# Patient Record
Sex: FEMALE | Race: BLACK OR AFRICAN AMERICAN
[De-identification: names, ages, dates, MRNs, and addresses within clinical notes are randomized per-mention and may not be internally consistent; named-entity substitution may affect disease eponyms.]

---

## 2017-03-16 ENCOUNTER — HOSPITAL ENCOUNTER (OUTPATIENT)
Dept: HOSPITAL 61 - MAMMO | Age: 47
Discharge: HOME | End: 2017-03-16
Attending: FAMILY MEDICINE
Payer: COMMERCIAL

## 2017-03-16 DIAGNOSIS — Z12.31: Primary | ICD-10-CM

## 2017-03-16 NOTE — RAD
DATE: 3/16/2017



EXAM: DIGITAL SCREEN BILAT W/CAD



HISTORY: Routine screening



COMPARISON: 8/3/2015



This study was interpreted with the benefit of Computerized Aided Detection

(CAD).







FINDINGS:



The breast parenchyma demonstrates heterogeneously dense breast tissue which

can obscured small masses, category C..  There are no dominant suspicious

masses, suspicious microcalcifications or evidence of architectural

distortion.



 Left axillary lymph node is identified.  







IMPRESSION: Benign findings







BI-RADS CATEGORY: 2 BENIGN FINDING



RECOMMENDED FOLLOW-UP: 12M 12 MONTH FOLLOW-UP



PQRS compliance statement: Patient information was entered into a reminder

system with a target due date 3/16/2018 for the next mammogram.



Mammography is a sensitive method for finding small breast cancers, but it

does not detect them all and is not a substitute for careful clinical

examination.  A negative mammogram does not negate a clinically suspicious

finding and should not result in delay in biopsying a clinically suspicious

abnormality.



"Our facility is accredited by the [employer] Mammography

Program."

## 2017-03-20 ENCOUNTER — HOSPITAL ENCOUNTER (OUTPATIENT)
Dept: HOSPITAL 61 - ECHO | Age: 47
Discharge: HOME | End: 2017-03-20
Attending: FAMILY MEDICINE
Payer: COMMERCIAL

## 2017-03-20 DIAGNOSIS — R07.9: ICD-10-CM

## 2017-03-20 DIAGNOSIS — R94.31: Primary | ICD-10-CM

## 2017-03-20 PROCEDURE — 93350 STRESS TTE ONLY: CPT

## 2017-03-20 PROCEDURE — 93017 CV STRESS TEST TRACING ONLY: CPT

## 2017-03-20 NOTE — CARD
--------------- APPROVED REPORT --------------





INDICATION

Abnormal ECG 

Chest Pain 



Reason : Abnormal EKG



PROCEDURE

The patient underwent an Exercise Stress Test using the Lloyd Protocol. Blood pressure, heart rate, a
nd EKG were monitored.

An Echocardiogram was performed by technician in four stages in quad fashion.  At peak stress four se
lected images were obtained and placed side by side with resting images for comparison.



STRESS ECHO FINDINGS

The resting Echocardiogram showed normal left ventricular contractility with an estimated Ejection Fr
action of about 65 %. 

Normal augmentation of myocardial wall segments using a 16 segment model.



Test Type:          Exercise

Stress Nurse/Tech: Rita Fleming R.N.

Test Indications: chest pain, abn ecg

Cardiac History and Allergies: none

Medications:     none

Medical History: none

Resting ECG:     sr

Resting Heart Rate: 85 bpm

Resting Blood Pressure: 149/80mmHg

Pretest Chest Pain: No chest pain



Nurse/Tech Notes

lungs cta, heart tones regular, good radial pulse

Consent: The procedure was explained to the patient in lay terms. Informed consent was witnessed. Trav
eout was entered into [employer]. History and Stress Test performed by Rita Fleming R.N.



Stress Symptoms

No chest pain or symptoms.



POST EXERCISE

Reason for Termination: Reached target heart rate

Target HR: Yes

Max HR: 169 bpm

97% of Maximum Predicted HR: 174 bpm

Exercise duration: 9:34 min:sec, 4 Stage

Exercise capacity: 10.1METs

Max Blood Pressure: 172/80mmHg

Blood Pressure response to exercise: Normal blood pressure response during stress.

Heart Rate response to exercise: normal

Chest Pain: No. 

Arrhythmia: No. 

ST Change: No. 



INTERPRETATION

Stress EKG Conclusion: Negative stress EKG. 



Preliminary Notification

Critical Value: No



<Conclusion>

Normal exercise stress echo with preserved EF of 65%. 

Normal exercise capacity at 10 mets.

## 2019-04-08 ENCOUNTER — HOSPITAL ENCOUNTER (OUTPATIENT)
Dept: HOSPITAL 61 - MAMMO | Age: 49
Discharge: HOME | End: 2019-04-08
Attending: FAMILY MEDICINE
Payer: COMMERCIAL

## 2019-04-08 DIAGNOSIS — Z12.31: Primary | ICD-10-CM

## 2019-04-08 PROCEDURE — 77063 BREAST TOMOSYNTHESIS BI: CPT

## 2019-04-08 PROCEDURE — 77067 SCR MAMMO BI INCL CAD: CPT

## 2019-04-09 NOTE — RAD
DATE: 4/8/2019



EXAM: MAMMO MURALI SCREENING BILATERAL



HISTORY: Annual screening



COMPARISON: 3/16/2017 mammographic exam, 8/3/2015 mammographic exam



This study was interpreted with the benefit of Computerized Aided Detection

(CAD).





Breast Density: SCATTERED The breast parenchyma shows scattered fibroglandular

densities. Breast parenchyma level B.





FINDINGS: Benign-appearing left axillary lymph nodes are present.  No

suspicious calcification grouping.  No dominant mass or distortion.  





IMPRESSION: Benign findings.







BI-RADS CATEGORY: 1 NEGATIVE



RECOMMENDED FOLLOW-UP: 12M 12 MONTH FOLLOW-UP



PQRS compliance statement: Patient information was entered into a reminder

system with a target due date in one year for the next mammogram.



Mammography is a sensitive method for finding small breast cancers, but it

does not detect them all and is not a substitute for careful clinical

examination.  A negative mammogram does not negate a clinically suspicious

finding and should not result in delay in biopsying a clinically suspicious

abnormality.



"Our facility is accredited by the [employer] Mammography

Program."